# Patient Record
Sex: MALE | Race: WHITE
[De-identification: names, ages, dates, MRNs, and addresses within clinical notes are randomized per-mention and may not be internally consistent; named-entity substitution may affect disease eponyms.]

---

## 2018-02-28 ENCOUNTER — HOSPITAL ENCOUNTER (EMERGENCY)
Dept: HOSPITAL 74 - JER | Age: 53
Discharge: HOME | End: 2018-02-28
Payer: COMMERCIAL

## 2018-02-28 VITALS — BODY MASS INDEX: 25.1 KG/M2

## 2018-02-28 VITALS — SYSTOLIC BLOOD PRESSURE: 134 MMHG | DIASTOLIC BLOOD PRESSURE: 97 MMHG | HEART RATE: 70 BPM

## 2018-02-28 VITALS — TEMPERATURE: 98.4 F

## 2018-02-28 DIAGNOSIS — R10.30: Primary | ICD-10-CM

## 2018-02-28 DIAGNOSIS — F41.9: ICD-10-CM

## 2018-02-28 DIAGNOSIS — G89.29: ICD-10-CM

## 2018-02-28 DIAGNOSIS — M54.5: ICD-10-CM

## 2018-02-28 DIAGNOSIS — E11.9: ICD-10-CM

## 2018-02-28 LAB
ALBUMIN SERPL-MCNC: 3.8 G/DL (ref 3.4–5)
ALP SERPL-CCNC: 47 U/L (ref 45–117)
ALT SERPL-CCNC: 30 U/L (ref 12–78)
ANION GAP SERPL CALC-SCNC: 9 MMOL/L (ref 8–16)
APPEARANCE UR: CLEAR
AST SERPL-CCNC: 22 U/L (ref 15–37)
BASOPHILS # BLD: 0.3 % (ref 0–2)
BILIRUB SERPL-MCNC: 0.3 MG/DL (ref 0.2–1)
BILIRUB UR STRIP.AUTO-MCNC: NEGATIVE MG/DL
BUN SERPL-MCNC: 14 MG/DL (ref 7–18)
CALCIUM SERPL-MCNC: 8.9 MG/DL (ref 8.5–10.1)
CHLORIDE SERPL-SCNC: 103 MMOL/L (ref 98–107)
CO2 SERPL-SCNC: 26 MMOL/L (ref 21–32)
COLOR UR: (no result)
CREAT SERPL-MCNC: 0.9 MG/DL (ref 0.7–1.3)
DEPRECATED RDW RBC AUTO: 13.2 % (ref 11.9–15.9)
EOSINOPHIL # BLD: 2.3 % (ref 0–4.5)
GLUCOSE SERPL-MCNC: 266 MG/DL (ref 74–106)
HCT VFR BLD CALC: 42 % (ref 35.4–49)
HGB BLD-MCNC: 14.3 GM/DL (ref 11.7–16.9)
KETONES UR QL STRIP: NEGATIVE
LEUKOCYTE ESTERASE UR QL STRIP.AUTO: NEGATIVE
LIPASE SERPL-CCNC: 155 U/L (ref 73–393)
LYMPHOCYTES # BLD: 24 % (ref 8–40)
MCH RBC QN AUTO: 28.7 PG (ref 25.7–33.7)
MCHC RBC AUTO-ENTMCNC: 34 G/DL (ref 32–35.9)
MCV RBC: 84.4 FL (ref 80–96)
MONOCYTES # BLD AUTO: 6.9 % (ref 3.8–10.2)
NEUTROPHILS # BLD: 66.5 % (ref 42.8–82.8)
NITRITE UR QL STRIP: NEGATIVE
PH UR: 5 [PH] (ref 5–8)
PLATELET # BLD AUTO: 188 K/MM3 (ref 134–434)
PMV BLD: 7.1 FL (ref 7.5–11.1)
POTASSIUM SERPLBLD-SCNC: 4.4 MMOL/L (ref 3.5–5.1)
PROT SERPL-MCNC: 7.6 G/DL (ref 6.4–8.2)
PROT UR QL STRIP: (no result)
PROT UR QL STRIP: NEGATIVE
RBC # BLD AUTO: 4.97 M/MM3 (ref 4–5.6)
RBC # UR STRIP: NEGATIVE /UL
SODIUM SERPL-SCNC: 138 MMOL/L (ref 136–145)
SP GR UR: 1.02 (ref 1–1.03)
UROBILINOGEN UR STRIP-MCNC: NEGATIVE MG/DL (ref 0.2–1)
WBC # BLD AUTO: 6.9 K/MM3 (ref 4–10)

## 2018-02-28 PROCEDURE — 3E0337Z INTRODUCTION OF ELECTROLYTIC AND WATER BALANCE SUBSTANCE INTO PERIPHERAL VEIN, PERCUTANEOUS APPROACH: ICD-10-PCS

## 2018-02-28 PROCEDURE — 3E0333Z INTRODUCTION OF ANTI-INFLAMMATORY INTO PERIPHERAL VEIN, PERCUTANEOUS APPROACH: ICD-10-PCS

## 2018-02-28 NOTE — PDOC
History of Present Illness





<Jaydon Moreno - Last Filed: 02/28/18 10:59>





- General


History Source: Patient


Exam Limitations: No Limitations





- History of Present Illness


Initial Comments: 





02/28/18 08:50


The patient is a 52 year old male with history of hypertension, DM, anxiety, 

who presents to the ED complaining of several days of LLQ pain that wraps 

around to his back and radiates down the LLE. Pain is worse with position 

changes. He also reports recent urinary urgency. No fever or chills. No nausea, 

vomiting, or diarrhea. No associated changes to strength or sensation. 





<Madhuri Tamayo - Last Filed: 02/28/18 11:05>





- General


Chief Complaint: Pain, Acute


Stated Complaint: PAIN


Time Seen by Provider: 02/28/18 07:36





Past History





- Past Medical History


COPD: No


Diabetes: Yes


HTN: Yes


Psychiatric Problems: Yes (ANXIETY)





- Suicide/Smoking/Psychosocial Hx


Smoking History: Never smoked





<Jyadon Moreno - Last Filed: 02/28/18 10:59>





<Madhuri Tamayo - Last Filed: 02/28/18 11:05>





- Past Medical History


Allergies/Adverse Reactions: 


 Allergies











Allergy/AdvReac Type Severity Reaction Status Date / Time


 


No Known Allergies Allergy   Verified 02/28/18 07:22














**Review of Systems





- Review of Systems


Able to Perform ROS?: Yes


Comments:: 





02/28/18 08:52


CONSTITUTIONAL: No fever, no chills, no fatigue


EYES: No visual changes


ENT: No ear pain, no sore throat


CARDIOVASCULAR: No chest pain, no palpitations


RESPIRATORY: No cough, no SOB


GI: LLQ pain. No nausea, no vomiting, no constipation, no diarrhea


GENITOURINARY: +Urgency. No dysuria, no frequency, no hematuria


MUSCULOSKELETAL: No backpain, no joint pain, no myalgias


SKIN: No rash


NEURO: No headache. Chronic RUE numbness. No LE paresthesias.





<Madhuri Tamayo - Last Filed: 02/28/18 11:05>





*Physical Exam





- Vital Signs


 Last Vital Signs











Temp Pulse Resp BP Pulse Ox


 


 98.4 F   82   19   155/83   97 


 


 02/28/18 07:23  02/28/18 07:23  02/28/18 07:23  02/28/18 07:23  02/28/18 07:23














<Jaydon Moreno - Last Filed: 02/28/18 10:59>





- Vital Signs


 Last Vital Signs











Temp Pulse Resp BP Pulse Ox


 


 98.4 F   82   19   155/83   97 


 


 02/28/18 07:23  02/28/18 07:23  02/28/18 07:23  02/28/18 07:23  02/28/18 07:23














- Physical Exam


Comments: 





02/28/18 11:03


CONSTITUTIONAL: Well-appearing; well-nourished; in no apparent distress


HEAD: Normocephalic; atraumatic


EYES: PERRL; EOM intact


ENMT: External appears normal; normal oropharynx


NECK: Supple; non-tender; no cervical lymphadenopathy


CARD: Normal S1, S2; no murmurs, rubs, or gallops


RESP: Normal chest excursion with respiration; breath sounds clear and equal 

bilaterally; no wheezes, rhonchi, or rales


ABD: Soft, non-distended; LLQ tenderness to palpation; no palpable organomegaly

, no palpable hernias


EXT: LLE negative straight leg raise, but discomfort with internal rotation and 

external rotation of the left lower extremity.


All other extremities: Normal ROM; non-tender to palpation; distal pulses intact


SKIN: Warm, dry, no rash


NEURO: No focal neurological deficiencies.





<Madhuri Tamayo - Last Filed: 02/28/18 11:05>





ED Treatment Course





- LABORATORY


CBC & Chemistry Diagram: 


 02/28/18 08:04





 02/28/18 08:04





<Jaydon Moreno - Last Filed: 02/28/18 10:59>





- LABORATORY


CBC & Chemistry Diagram: 


 02/28/18 08:04





 02/28/18 08:04





- ADDITIONAL ORDERS


Additional order review: 


 Laboratory  Results











  02/28/18





  08:04


 


Urine Color  Ltyellow


 


Urine Appearance  Clear


 


Urine pH  5.0


 


Ur Specific Gravity  1.016


 


Urine Protein  Negative


 


Urine Glucose (UA)  3+ H


 


Urine Ketones  Negative


 


Urine Blood  Negative


 


Urine Nitrite  Negative


 


Urine Bilirubin  Negative


 


Urine Urobilinogen  Negative


 


Ur Leukocyte Esterase  Negative








 











  02/28/18





  08:04


 


RBC  4.97


 


MCV  84.4


 


MCHC  34.0


 


RDW  13.2


 


MPV  7.1 L


 


Neutrophils %  66.5


 


Lymphocytes %  24.0


 


Monocytes %  6.9


 


Eosinophils %  2.3


 


Basophils %  0.3














- RADIOLOGY


Radiology Studies Ordered: 





02/28/18 09:27


CT of abdomen and pelvis, read and reviewed by Dr. Miller


Impression: No CT evidence of uterolithiasis or hydronephrosis. 


Mild sigmoid diverticulosis is seen without acute diverticulitis.


? history of diabetes


Artherosclerotic vascular changes are noted which are probably somewhat more 

prominwnr than what would be expected for the patient's chronological age. 





- Medications


Given in the ED: 


ED Medications














Discontinued Medications














Generic Name Dose Route Start Last Admin





  Trade Name Freboo  PRN Reason Stop Dose Admin


 


Ketorolac Tromethamine  30 mg  02/28/18 08:06  02/28/18 08:26





  Toradol Injection -  IVPUSH  02/28/18 08:07  30 mg





  ONCE ONE   Administration














<Madhuri Tamayo - Last Filed: 02/28/18 11:05>





Medical Decision Making





- Medical Decision Making





02/28/18 11:00


52-year-old male with history of diabetes, anxiety, chronic low back pain, 

presents to the ER with atraumatic lower abdominal pain for the past week, 

without associated nausea/vomiting/diarrhea/hematuria. Patient does report 

urinary urgency. In the ER, patient is awake and alert, afebrile, well-appearing

, with mild left lower quadrant tenderness to palpation and pain on external/

internal rotation of the left hip. Straight leg raise is negative bilaterally. 

There is no midline tenderness to palpation and patient's gait is normal. DTRs 

are +2 bilaterally to the lower extremity; CT of abdomen and pelvis reveals no 

evidence of acute intra-abdominal pathology. No evidence of appendicitis/

diverticulitis/nephrolithiasis. Patient's symptoms may be related to underlying 

lumbar radiculopathy. Urinalysis reveals glycyurea only without evidence of 

acute cystitis. Patient tolerates by mouth. Will discharge with outpatient 

follow-up further investigation.





<Jaydon Moreno - Last Filed: 02/28/18 10:59>





*DC/Admit/Observation/Transfer





- Attestations


Physician Attestion: 





02/28/18 10:59


The documentation was prepared by the scribe under my direct supervision. I 

have reviewed the documentation which correctly represents the findings, 

medical decision-making and critical action taken by me.





<Jaydon Moreno - Last Filed: 02/28/18 10:59>





- Attestations


Scribe Attestion: 





02/28/18 08:55





Documentation prepared by Madhuri Tamayo, acting as medical scribe for Jaydon Moreno MD.





<Madhuri Tamayo - Last Filed: 02/28/18 11:05>


Diagnosis at time of Disposition: 


Abdominal pain


Qualifiers:


 Abdominal location: lower abdomen, unspecified Qualified Code(s): R10.30 - 

Lower abdominal pain, unspecified








- Discharge Dispostion


Disposition: HOME





- Referrals


Referrals: 


Isis Cash [Primary Care Provider] - 





- Patient Instructions


Printed Discharge Instructions:  [facility] for Abdominal Pain-Adult





- Post Discharge Activity